# Patient Record
Sex: MALE | Race: WHITE
[De-identification: names, ages, dates, MRNs, and addresses within clinical notes are randomized per-mention and may not be internally consistent; named-entity substitution may affect disease eponyms.]

---

## 2020-03-26 ENCOUNTER — HOSPITAL ENCOUNTER (INPATIENT)
Dept: HOSPITAL 95 - ER | Age: 80
LOS: 11 days | Discharge: NURSING, REHAB OR LONG-TERM CARE | DRG: 871 | End: 2020-04-06
Attending: FAMILY MEDICINE | Admitting: HOSPITALIST
Payer: COMMERCIAL

## 2020-03-26 VITALS — HEIGHT: 70 IN | WEIGHT: 181.88 LBS | BODY MASS INDEX: 26.04 KG/M2

## 2020-03-26 DIAGNOSIS — N39.0: ICD-10-CM

## 2020-03-26 DIAGNOSIS — Z95.1: ICD-10-CM

## 2020-03-26 DIAGNOSIS — Z79.82: ICD-10-CM

## 2020-03-26 DIAGNOSIS — E03.9: ICD-10-CM

## 2020-03-26 DIAGNOSIS — Z96.1: ICD-10-CM

## 2020-03-26 DIAGNOSIS — I25.10: ICD-10-CM

## 2020-03-26 DIAGNOSIS — Z91.040: ICD-10-CM

## 2020-03-26 DIAGNOSIS — Z88.2: ICD-10-CM

## 2020-03-26 DIAGNOSIS — Z85.46: ICD-10-CM

## 2020-03-26 DIAGNOSIS — A41.89: Primary | ICD-10-CM

## 2020-03-26 DIAGNOSIS — I10: ICD-10-CM

## 2020-03-26 DIAGNOSIS — E87.6: ICD-10-CM

## 2020-03-26 DIAGNOSIS — Z88.8: ICD-10-CM

## 2020-03-26 DIAGNOSIS — E11.9: ICD-10-CM

## 2020-03-26 DIAGNOSIS — K21.9: ICD-10-CM

## 2020-03-26 DIAGNOSIS — Z92.3: ICD-10-CM

## 2020-03-26 DIAGNOSIS — G92: ICD-10-CM

## 2020-03-26 DIAGNOSIS — E80.6: ICD-10-CM

## 2020-03-26 DIAGNOSIS — F02.80: ICD-10-CM

## 2020-03-26 DIAGNOSIS — Z79.4: ICD-10-CM

## 2020-03-26 DIAGNOSIS — Z74.09: ICD-10-CM

## 2020-03-26 DIAGNOSIS — G40.A09: ICD-10-CM

## 2020-03-26 DIAGNOSIS — F34.1: ICD-10-CM

## 2020-03-26 DIAGNOSIS — I48.91: ICD-10-CM

## 2020-03-26 DIAGNOSIS — G30.9: ICD-10-CM

## 2020-03-26 DIAGNOSIS — I25.2: ICD-10-CM

## 2020-03-26 DIAGNOSIS — E87.1: ICD-10-CM

## 2020-03-26 DIAGNOSIS — G47.33: ICD-10-CM

## 2020-03-26 DIAGNOSIS — Z88.6: ICD-10-CM

## 2020-03-26 DIAGNOSIS — Z88.5: ICD-10-CM

## 2020-03-26 DIAGNOSIS — Z87.440: ICD-10-CM

## 2020-03-26 LAB
ALBUMIN SERPL BCP-MCNC: 3.4 G/DL (ref 3.4–5)
ALBUMIN/GLOB SERPL: 0.6 {RATIO} (ref 0.8–1.8)
ALT SERPL W P-5'-P-CCNC: 22 U/L (ref 12–78)
ANION GAP SERPL CALCULATED.4IONS-SCNC: 11 MMOL/L (ref 6–16)
AST SERPL W P-5'-P-CCNC: 27 U/L (ref 12–37)
BILIRUB SERPL-MCNC: 1.3 MG/DL (ref 0.1–1)
BUN SERPL-MCNC: 16 MG/DL (ref 8–24)
CALCIUM SERPL-MCNC: 9.2 MG/DL (ref 8.5–10.1)
CHLORIDE SERPL-SCNC: 98 MMOL/L (ref 98–108)
CO2 SERPL-SCNC: 19 MMOL/L (ref 21–32)
CREAT SERPL-MCNC: 0.7 MG/DL (ref 0.6–1.2)
ETHANOL SERPL-MCNC: <3 MG/DL
GLOBULIN SER CALC-MCNC: 5.4 G/DL (ref 2.2–4)
GLUCOSE SERPL-MCNC: 256 MG/DL (ref 70–99)
GLUCOSE UR-MCNC: (no result) MG/DL
KETONES UR STRIP-MCNC: (no result) MG/DL
LEUKOCYTE ESTERASE UR QL STRIP: (no result)
POTASSIUM SERPL-SCNC: 4.7 MMOL/L (ref 3.5–5.5)
PROT SERPL-MCNC: 8.8 G/DL (ref 6.4–8.2)
PROT UR STRIP-MCNC: (no result) MG/DL
RBC #/AREA URNS HPF: (no result) /HPF (ref 0–2)
SODIUM SERPL-SCNC: 128 MMOL/L (ref 136–145)
SP GR SPEC: 1.02 (ref 1–1.02)
UROBILINOGEN UR STRIP-MCNC: (no result) MG/DL
WBC #/AREA URNS HPF: (no result) /HPF (ref 0–5)

## 2020-03-26 PROCEDURE — G0008 ADMIN INFLUENZA VIRUS VAC: HCPCS

## 2020-03-26 PROCEDURE — A9270 NON-COVERED ITEM OR SERVICE: HCPCS

## 2020-03-26 PROCEDURE — G0480 DRUG TEST DEF 1-7 CLASSES: HCPCS

## 2020-03-27 LAB
ALBUMIN SERPL BCP-MCNC: 3.1 G/DL (ref 3.4–5)
ALBUMIN/GLOB SERPL: 0.7 {RATIO} (ref 0.8–1.8)
ALT SERPL W P-5'-P-CCNC: 20 U/L (ref 12–78)
ANION GAP SERPL CALCULATED.4IONS-SCNC: 8 MMOL/L (ref 6–16)
AST SERPL W P-5'-P-CCNC: 13 U/L (ref 12–37)
BASOPHILS # BLD AUTO: 0.06 K/MM3 (ref 0–0.23)
BASOPHILS NFR BLD AUTO: 1 % (ref 0–2)
BILIRUB SERPL-MCNC: 1 MG/DL (ref 0.1–1)
BUN SERPL-MCNC: 15 MG/DL (ref 8–24)
CALCIUM SERPL-MCNC: 9 MG/DL (ref 8.5–10.1)
CHLORIDE SERPL-SCNC: 103 MMOL/L (ref 98–108)
CO2 SERPL-SCNC: 23 MMOL/L (ref 21–32)
CREAT SERPL-MCNC: 0.86 MG/DL (ref 0.6–1.2)
DEPRECATED RDW RBC AUTO: 50.4 FL (ref 35.1–46.3)
EOSINOPHIL # BLD AUTO: 0.13 K/MM3 (ref 0–0.68)
EOSINOPHIL NFR BLD AUTO: 1 % (ref 0–6)
ERYTHROCYTE [DISTWIDTH] IN BLOOD BY AUTOMATED COUNT: 13.9 % (ref 11.7–14.2)
GLOBULIN SER CALC-MCNC: 4.3 G/DL (ref 2.2–4)
GLUCOSE SERPL-MCNC: 202 MG/DL (ref 70–99)
HCT VFR BLD AUTO: 43.3 % (ref 37–53)
HGB BLD-MCNC: 14.2 G/DL (ref 13.5–17.5)
IMM GRANULOCYTES # BLD AUTO: 0.09 K/MM3 (ref 0–0.1)
IMM GRANULOCYTES NFR BLD AUTO: 1 % (ref 0–1)
LYMPHOCYTES # BLD AUTO: 1.76 K/MM3 (ref 0.84–5.2)
LYMPHOCYTES NFR BLD AUTO: 14 % (ref 21–46)
MCHC RBC AUTO-ENTMCNC: 32.8 G/DL (ref 31.5–36.5)
MCV RBC AUTO: 98 FL (ref 80–100)
MONOCYTES # BLD AUTO: 1.35 K/MM3 (ref 0.16–1.47)
MONOCYTES NFR BLD AUTO: 11 % (ref 4–13)
NEUTROPHILS # BLD AUTO: 9.27 K/MM3 (ref 1.96–9.15)
NEUTROPHILS NFR BLD AUTO: 73 % (ref 41–73)
NRBC # BLD AUTO: 0 K/MM3 (ref 0–0.02)
NRBC BLD AUTO-RTO: 0 /100 WBC (ref 0–0.2)
PLATELET # BLD AUTO: 280 K/MM3 (ref 150–400)
POTASSIUM SERPL-SCNC: 4 MMOL/L (ref 3.5–5.5)
PROT SERPL-MCNC: 7.4 G/DL (ref 6.4–8.2)
SODIUM SERPL-SCNC: 134 MMOL/L (ref 136–145)

## 2020-03-27 NOTE — NUR
ON ADMIT TO THE FLOOR PT HAS A VERY RED AND SORE INNER THIGHS, PENIS, SCROTUM,
GLUTIAL CLEFT, AND GENERAL BITA AREA. NYSTATIC AND BARRIER CREAM APPLIED AND
ATTENDS CHANGED. ON REPORT IT WAS RELATED TO ME THAT PT HAD BEEN LAYING IN
URINE SOAKED GARMETS FOR SEVERAL DAYS.

## 2020-03-27 NOTE — NUR
SHIFT SUMMARY
PT IS CONFUSED, NOT UNDERSTANDING OF CARE. INCONTINENT OF BOWEL AND BLADDER.
HAS HAD MULTIPLE BM'S AND VOIDS THIS NIGHT. BITA AREA IS VERY RED AND SORE BUT
OVER THE CORSE OF THE NIGHT HAS IMPROVED WITH BARRIER CREAM AND NYSTATIN. HE
HAS HISTORY OF DEMENTIA. NS IS RUNNING /HR AND FIRST BAG IS ALMOST
COMPLETED. PT IS MUCH MORE ALERT THAN ON ADMIT. PT IS PRESENTLY LAYING IN BED
AND APPEARS TO BE SLEEPING. APPEARS TO BE IN NO ACUTE DISTRESS. VSS. RESP E/U
ON ROOM AIR. WILL CONTINUE TO MONITOR.

## 2020-03-27 NOTE — NUR
SHIFT SUMMARY
 
PT A/O X 2. PT UNABLE TO CALL APPROPRIATELY OR MAKE NEEDS KNOWN. PT TURNED Q 2
HOURS AND BITA CARE COMPLETED AT EACH INCONTINENT EPISODE. FLUIDS COMPLETED
AND IV ABX INFUSED. PT IS UNABLE TO REMAIN ALERT/AWAKE ENOUGH TO FEED SELF.
NURSING ORDER ENTERED TO HAVE PT SUPERVISED WHILE INTAKE OCCURS. PT HAS SCABS
ALL OVER BODY AND HAS A SIGNIFICANT RED RASH IN GROIN AND BUTTOCKS AREA WITH
NYSTATIN APPLIED AS ORDERED. LN TO CONTINUE TO MONOTOR.

## 2020-03-28 NOTE — NUR
PT STATED HE IS VERY THIRSTY. DOES ATTEMPT TO DRINK EXCESSIVELY. CONTINUES TO
SLIP TO "ABSENCE" STATE FOR 1/2 TO 1 MIN. THIS HAPPENS REGULARLY EVEN WHILE
TALKING. PICKED UP ARM, WHICH WAS LIMP. DROPPED ON SOFT SIDE OF BED AT PATIENT
SIDE, PT OFFERED NO RESISTANCE. PT "AWAKE" AGAIN, ASKED FOR DIET PEPSI FROM
[facility]. THEN AGAIN DRIFTED OFF.

## 2020-03-28 NOTE — NUR
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
RESPONDING MIDSENTENCE. LAST ABOUT A MINUTE. STERNAL RUB BARELY RESPONDS.
AFTER ABOUT MINUTE, WOULD RESPOND APPROP. 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
THIS HAPPENING SEVERAL TIMES THROUGHOUT ASSESSMENT. REPORTED TO DR TRAN.
ORDERS MADE. H/R REG, NO MURMER NOTED. NO TELE. LUNGS CLEAR RESP EASY,
UNLABORED, ON R/A. BT X4 LAST BM TODAY. LOOSE. REPORTED TO DR TRAN. STOOL
SAMPLE OBTAINED. R/O CDIFF. VOIDS INCONT. IN ATTENDS. BED IN LOW POSITION,
CALL LITE IN REACH, CALLS APPROP

## 2020-03-28 NOTE — NUR
SHIFT SYUMMARY: PATIENT IS A&O TO SELF AND PLACE, PLACE IS INTERMITTENT. MUST
BE CUED TO OPEN EYE'S WHEN SPEAKING. PATIENT IS REQUESTING FLUIDS AND FOOD.
WATER WAS GIVEN WITHOUT DIFFICULTY, PATIENT MUST BE ASSISTED FOR MEALS.
PERSISTANT LOW GRADE TEMP WAS REPORTED TO DR ASH, ORDER FOR TYLENOL PRN WAS
OBTAINED. BED ALARM IS ON FOR SAFETY.

## 2020-03-28 NOTE — NUR
PT PLEASANT WHEN AWAKE. PT STATES TO BE THIRSTY OFTEN. DOES DRINK WELL WHEN
AWAKE. CONTINUES TO DRIFT OFF TO "ABSENCE" REGULARLY, OFTEN MID SENTENCE. 
NOTIFIED. PENDING EEG. WAS TESTED TODAY FOR C-DIFF AS LOOSE STOOL NOTED.
NEGATIVE. NO OTHER CONCERNS AT THIS TIME. BED IN LOW POSITION, CALL LITE IN
REACH, BED ALARM ON FOR SAFETY

## 2020-03-28 NOTE — NUR
TEMPERATURE: PATIENT HAS A LOW GRADE TEMP. AND GENERALIZED PAIN. DR ASH IS
CALLED AND AN ORDER FOR TYLENOL 650MG Q 4 HOURS PRN IS OBTAINED.

## 2020-03-29 LAB
ANION GAP SERPL CALCULATED.4IONS-SCNC: 7 MMOL/L (ref 6–16)
BASOPHILS # BLD AUTO: 0.06 K/MM3 (ref 0–0.23)
BASOPHILS NFR BLD AUTO: 1 % (ref 0–2)
BUN SERPL-MCNC: 11 MG/DL (ref 8–24)
CALCIUM SERPL-MCNC: 9.1 MG/DL (ref 8.5–10.1)
CHLORIDE SERPL-SCNC: 104 MMOL/L (ref 98–108)
CO2 SERPL-SCNC: 25 MMOL/L (ref 21–32)
CREAT SERPL-MCNC: 0.9 MG/DL (ref 0.6–1.2)
DEPRECATED RDW RBC AUTO: 51.7 FL (ref 35.1–46.3)
EOSINOPHIL # BLD AUTO: 0.25 K/MM3 (ref 0–0.68)
EOSINOPHIL NFR BLD AUTO: 4 % (ref 0–6)
ERYTHROCYTE [DISTWIDTH] IN BLOOD BY AUTOMATED COUNT: 14.1 % (ref 11.7–14.2)
GLUCOSE SERPL-MCNC: 205 MG/DL (ref 70–99)
HCT VFR BLD AUTO: 42.7 % (ref 37–53)
HGB BLD-MCNC: 14.1 G/DL (ref 13.5–17.5)
IMM GRANULOCYTES # BLD AUTO: 0.06 K/MM3 (ref 0–0.1)
IMM GRANULOCYTES NFR BLD AUTO: 1 % (ref 0–1)
LYMPHOCYTES # BLD AUTO: 1.21 K/MM3 (ref 0.84–5.2)
LYMPHOCYTES NFR BLD AUTO: 17 % (ref 21–46)
MCHC RBC AUTO-ENTMCNC: 33 G/DL (ref 31.5–36.5)
MCV RBC AUTO: 99 FL (ref 80–100)
MONOCYTES # BLD AUTO: 0.68 K/MM3 (ref 0.16–1.47)
MONOCYTES NFR BLD AUTO: 10 % (ref 4–13)
NEUTROPHILS # BLD AUTO: 4.73 K/MM3 (ref 1.96–9.15)
NEUTROPHILS NFR BLD AUTO: 68 % (ref 41–73)
NRBC # BLD AUTO: 0 K/MM3 (ref 0–0.02)
NRBC BLD AUTO-RTO: 0 /100 WBC (ref 0–0.2)
PLATELET # BLD AUTO: 227 K/MM3 (ref 150–400)
POTASSIUM SERPL-SCNC: 3.4 MMOL/L (ref 3.5–5.5)
SODIUM SERPL-SCNC: 136 MMOL/L (ref 136–145)

## 2020-03-29 NOTE — NUR
NEURO: PATIENT IS BACK FROM CT, PATIENT WAS NOT COOPERATIVE WITH IMAGING AND
MAY ALTER QUALITY PER CT TECH. PATIENT IS NO MORE ALERT AND IS REPORTING
HEADACHE PAIN 6/10, FIORICET IS GIVEN.

## 2020-03-29 NOTE — NUR
PT ALERT. SITTING UP IN BED, ATTENDS CHANGED, WARM BLANKETS PLACED. MEDICATED
w/TYLENOL x2 FOR HA TODAY. NO DYSPHAGIA NOTED WHILE TAKING PO MEDS. PT APPEARS
TO HAVE SUDDEN BUT SHORT MOMENTS OF A TRANCE-LIKE STATE T/O MY SHIFT. BS
COVERAGE REQUIRED AT LUNCH & DINNER. PT REQUIRES FEEDING FOR ALL MEALS. PT
WAS PLEASANT & COOPERATIVE T/O SHIFT. NO SIGNS OF DISTRESS. NO ACUTE CHANGES
AT END OF MY CARE.

## 2020-03-29 NOTE — NUR
SHIFT SUMMARY: PATIENT IS AWAKE THIS AM, ALERT TO SELF AND PLACE
CALLING OUT FOR SOMETHING TO DRINK,
EXCESSIVE THIRST PERSISTS. INC. OF B&B, BITA CARE IS GIVEN WITH T&P. CT SCAN
SHOWS NO ACUTE ABNOMATITIES.

## 2020-03-29 NOTE — NUR
PAIN: PATIENT IS REPORTING HEADACHE PAIN, TYLENOL WAS GIVEN AND PATIENT
STATES, "THIS HEADACHE HURTS SO BAD, WHEN WILL IT GO AWAY"?

## 2020-03-29 NOTE — NUR
NEURO: DR MENARD WAS NOTIFIED OF HEADACHE PAIN NOT RELIVED BY TYLENOL. AM
LABS WERE DRAW EARLY AND DR REVIEWED RESULTS. ORDERS FOR FIORICET Q 6 H PRN
AND HEAD CY WERE OBTAINED. WHEN MEDICATION WAS ATTEMPTED PATIENT IS NOW
SNORINF AND DIFFICULT TO WAKE. WHEN PATIENT IS AROUSED HE STATES, "JUST LEAVE
ME ALONE". MEDICATION IS NOT GIVEN AND PATIENT IS TAKEN TO CT.

## 2020-03-30 NOTE — NUR
SHIFT SUMMARY:
NO ACUTE CHANGES TO REPORT THIS SHIFT. PT ALERT; ORIENTED TO SELF AND PLACE.
HX DEMENTIA, SEIZURES; SEIZURE MEDS ADJUSTED THIS SHIFT (DR TRAN). PT ON
BEDREST. AWAITING EEG FOR SEIZURE ACTIVITY. REPORT GIVEN TO ONCOMING RN.

## 2020-03-30 NOTE — NUR
SHIFT SUMMARY: PATIENT IS A&O X 2, PLACE AND SELF. COMPLIANS OF INTERMITTENT
HEADACHE, TYLENOL WAS GIVEN WITH GOOD EFFECT. PATIENT CONTINUES TO HAVE
EPISODES OF ABRUPT DECREASED RESPONSE. IT IS NOTED THAT PUPLS ARE 1 MM DURING
THE EPISODE, VS REMAIN STABLE. PATIENT DID NOT SLEEP WELL THIS SHIFT AND IS
SCHEDULE FOR EEG TODAY.

## 2020-03-31 NOTE — NUR
03/31/20  0600  PT SLEPT ON AND OFF. MEDICATED FOR HEADACHE ONCE. INCONTINENT
OF URINE AND CHANGED PRN. VITALS STABLE.

## 2020-03-31 NOTE — NUR
Pt resting in bed upon arrival. Pt is pleasantly confused and orientated to
self. Pt appears mildly anxious with no other non verbal indicators of
discomfort.
 
Spoke with Bedside RN Basil and discussed case. Plan is for EEG this AM once
Pt is settled and able to sit still long enough to perform test.
 
Called and spoke with Pt's son Leo. Updated Leo on plan of care and answered
questions. Offered therapeutic listening. Leo reports at baseline Pt is
ambulatory with walker. Pt is able to bathe and dress himself some times but
requires assistance occasionaly. Pt has experienced incontinence of urine
since having prostate cancer. Pt receives in home caregiver support for 3
hours a day 5 days a week. Leo works full time. Discussed the possibility of
needing more caregiver support or a higher level of care if Pt does not return
to baseline.  Continued therapeutic listening. Discussed Advanced Directive
and POLST. Leo reports Pt would want everything done in an acute event but
would not want long term life support. No other concerns reported at this
time.
 
Spoke with Caremanager Perla and discussed case.
 
Received copy of [facility] via fax per this RN's
request. Will send copy to medical records.
 
Palliative Care will remain available.

## 2020-03-31 NOTE — NUR
SHIFT SUMMARY:
NO ACUTE EVENTS TO REPORT THIS SHIFT. PT ALERT; ORIENTED TO SELF AND PLACE.
MEDICATED FOR HEADACHE PAIN PER EMAR. FEEDER; PO MEDS WHOLE IN THIN LIQUIDS.
EEG THIS SHIFT; AWAITING RESULTS. REPORT GIVEN TO ONCOMING RN.

## 2020-04-01 NOTE — NUR
Pt visit this AM. Pt is A&OX3 at the time of visit. Pt engages in nonsensical
conversation periodically. Spoke with Bedside RN Stan. Stan reports Pt is
still experiencing absence sizures.
 
Reviewed chart.
 
Palliative Care will remain available.

## 2020-04-01 NOTE — NUR
04/01/20   0600  PT SLEEPING ON AND OFF. VITALS STABLE AND WAS MEDICATED FOR
HEADACHES TWICE THIS SHIFT. BITA-CARE GIVEN WITH BRIEF CHANGES. CONTINUES TO
HAVE OCC. PETITE MAL SIEZURES WITHOUT ANY RESP, AFFECT. MD IS AWARE.

## 2020-04-01 NOTE — NUR
PT IS A/OX3, THE PT HAS MILD DEMENTIA AND FORGETFULLNESS, THE PT APPEARS TO BE
BREATHING EASILY ON RA. THE PT TODAY WAS VERY SLEEPY, CONTINUES TO HAVE BLANK
STARES AT TIMES, THE WAS ENCOURAGED TO EAT AND ASSISTED WITH MEALS, HOWEVER
ATE VERY LITTLS, THE PTS SCROTUM AND COCCYX IS RED AND EXCORIATED, LOTION AND
NYSTATIN POWDER WAS APPLIED INTERMITIANTLY T/O THE DAY, THE PHYSICAL
THERAPIST ATTEMPTED TO ASSIST THE PT UP, THE PT SAID THAT HE WAS WILLING,
KATT, WHEN THE ATTEMPT WAS MADE TO SIT AT THE SIDE OF THE BED HE REFUSED AND
LAYED BACK DOWN, PTS CALL LIGHT IS WITHIN REACH, WILL CONTINUE TO MONITOR AND
ASSESS FOR CHANGES

## 2020-04-02 LAB
ANION GAP SERPL CALCULATED.4IONS-SCNC: 10 MMOL/L (ref 6–16)
BASOPHILS # BLD AUTO: 0.04 K/MM3 (ref 0–0.23)
BASOPHILS NFR BLD AUTO: 1 % (ref 0–2)
BUN SERPL-MCNC: 9 MG/DL (ref 8–24)
CALCIUM SERPL-MCNC: 9.3 MG/DL (ref 8.5–10.1)
CHLORIDE SERPL-SCNC: 101 MMOL/L (ref 98–108)
CO2 SERPL-SCNC: 26 MMOL/L (ref 21–32)
CREAT SERPL-MCNC: 0.77 MG/DL (ref 0.6–1.2)
DEPRECATED RDW RBC AUTO: 48.6 FL (ref 35.1–46.3)
EOSINOPHIL # BLD AUTO: 0.24 K/MM3 (ref 0–0.68)
EOSINOPHIL NFR BLD AUTO: 4 % (ref 0–6)
ERYTHROCYTE [DISTWIDTH] IN BLOOD BY AUTOMATED COUNT: 13.7 % (ref 11.7–14.2)
GLUCOSE SERPL-MCNC: 120 MG/DL (ref 70–99)
HCT VFR BLD AUTO: 44.7 % (ref 37–53)
HGB BLD-MCNC: 14.8 G/DL (ref 13.5–17.5)
IMM GRANULOCYTES # BLD AUTO: 0.1 K/MM3 (ref 0–0.1)
IMM GRANULOCYTES NFR BLD AUTO: 2 % (ref 0–1)
LYMPHOCYTES # BLD AUTO: 2.61 K/MM3 (ref 0.84–5.2)
LYMPHOCYTES NFR BLD AUTO: 39 % (ref 21–46)
MCHC RBC AUTO-ENTMCNC: 33.1 G/DL (ref 31.5–36.5)
MCV RBC AUTO: 96 FL (ref 80–100)
MONOCYTES # BLD AUTO: 0.62 K/MM3 (ref 0.16–1.47)
MONOCYTES NFR BLD AUTO: 9 % (ref 4–13)
NEUTROPHILS # BLD AUTO: 3.15 K/MM3 (ref 1.96–9.15)
NEUTROPHILS NFR BLD AUTO: 47 % (ref 41–73)
NRBC # BLD AUTO: 0 K/MM3 (ref 0–0.02)
NRBC BLD AUTO-RTO: 0 /100 WBC (ref 0–0.2)
PLATELET # BLD AUTO: 254 K/MM3 (ref 150–400)
POTASSIUM SERPL-SCNC: 3.4 MMOL/L (ref 3.5–5.5)
SODIUM SERPL-SCNC: 137 MMOL/L (ref 136–145)

## 2020-04-02 NOTE — NUR
SHIFT SUMMARY:
VSS. AFEB. A/OX1. ABLE TO GIVE SOME PERSONAL HEALTH HISTORY BUT DOES NOT
RECALL D.O.B, CURRENT DATE, OR LOCATION. CALLS OUT RATHER THAN USES CALL
BUTTON FOR ASSIST. REPORTS HA- MEDICATED W/TYL W/GOOD EFFECT. PT REMAINED IN
BED, ACCEPTED MEDS AND OFFER OF FLUIDS. BED LOW, CALL BUTTON IN REACH, BED
ALARM ON. NO ATTEMPTS TO SELF T/F. NO ACUTE CHANGES OVERNIGHT. WILL CONT TO
MONITOR.

## 2020-04-02 NOTE — NUR
PT IS A/OX3, PT IS SLOW TO RESPOND AT TIMES, THE PT CONTINUES TO HAVE BLANK
STARES AT TIMES, FORGETFULL AT TIMES, THE PT APPEARS TO BE BREATHING EASILY ON
RA AT THIS TIME, THE PT WAS ENCOURAGED TO GET UP OUT OF BED TODAY AND AGREED,
HOWEVR, EACH TIME HE WAS OFFERED ASSISTANTANCE HE RESISTED AND WOULD NOT
PARTICIPATE, PT WAS ASSISTED WITH MEALS, HOWEVER HAD A POOR APPETITE, THE
OCCUPATIONAL THERAPIST ATTEMPTED TO DUE A COGNATIVE ASSESSMENT WITH THE PT
HOWEVER, HE DID NOT PARTICIPATE, CALL LIGHT IN REACH, WILL CONTINUE TO MONITOR
AND ASSESS FOR CHANGES

## 2020-04-03 LAB
ANION GAP SERPL CALCULATED.4IONS-SCNC: 7 MMOL/L (ref 6–16)
BUN SERPL-MCNC: 12 MG/DL (ref 8–24)
CALCIUM SERPL-MCNC: 9.1 MG/DL (ref 8.5–10.1)
CHLORIDE SERPL-SCNC: 102 MMOL/L (ref 98–108)
CO2 SERPL-SCNC: 26 MMOL/L (ref 21–32)
CREAT SERPL-MCNC: 0.78 MG/DL (ref 0.6–1.2)
GLUCOSE SERPL-MCNC: 128 MG/DL (ref 70–99)
POTASSIUM SERPL-SCNC: 3.8 MMOL/L (ref 3.5–5.5)
SODIUM SERPL-SCNC: 135 MMOL/L (ref 136–145)

## 2020-04-03 NOTE — NUR
SHIFT SUMMARY-
PT ALERT AND ORIENTED TO SELF. PT BECOMES VERY COMBATIVE WHEN STAFF ARE
ROLLING AND CHANGING HIM. HE IS VERY FEARFULL OF FALLING. PT HAS MOMENTS WHERE
HE SEEMS TO BE NORMAL FUNCTIONING AND THEN HE WILL STARE OFF INTO SPACE AND
DROP ITEMS THAT HE IS HOLDING. PT DID WORK WITH PHYSICAL THERAPY TODAY. PT TOO
FEARFUL TO MOVE BUT DID PERFORM 4 SIT TO STANDS. PT HAS DENIED THE NEED FOR
PAIN MANAGEMENT MEDICATION T/O THE SHIFT. PT INCONTINENT OF BOWEL AND BLADDER,
MEPILEX ON COCCYX WAS CHANGED TODAY. PT WAS SEEN BY DR CHARLTON TODAY. PG
IN RIGHT UPPER ARM DOES NOT DRAW BUT FLUSHES WELL. WILL PASS ON IN BEDSIDE
REPORT TO NIGHT RN.

## 2020-04-03 NOTE — NUR
SPOKE TO DR PITT-
PER  RN TO CALL PHYSICAL THERAPY AND HAVE THEM EVALUATE THE PT. PER DR PITT PT STATES HE PROMISES TO WORK WITH THERAPY IF THEY COME BACK TODAY.
SPOKE TO PHYSICAL THERAPIST AND SHE IS AWARE. ORDER FOR PT EVAL AND TREAT
PLACED IN PT ORDERS PER DR PITT.

## 2020-04-03 NOTE — NUR
SHIFT SUMMARY
ASSUMED CARE OF PT AT 1900. PT IS A/OX3, BUT IS CONFUSED AT TIMES AND DOESNT
FOLLOW DIRECTIONS ALL THE TIME, PT DENIES N/T IN HIS EXTREMITIES. HEART SOUNDS
REGULAR, LUNG SOUNDS CLEAR. PT WAS ABLT TO SWOLLOW PILL WITH WATER. PT HAS
REDNESS ON HIS BOTTOM, MEPILEX APPLIED. PT HAS SMALL ABRASION ON HIS PENIS,
LEFT OPEN TO AIR. PT CALLED OUT FOR THE NURSE DURING THE NIGHT, BUT HE WOULD
HAVE HIS EYES CLOSED WHEN ENTERING THE ROOM. PT ATE LOTS OF SNACKS WITH
ASSISTANCE FROM CNA.
 
NO ACUTE EVENTS DURING THE NIGHT, PT SLEPT MOST OF THE NIGHT. CALL LIGHT IN
REACH, BED IN LOWEST POSTION, WILL CONTINUE TO MONITOR UNTIL DAYSHIFT NURSE
ARRIVES.

## 2020-04-04 LAB
ANION GAP SERPL CALCULATED.4IONS-SCNC: 6 MMOL/L (ref 6–16)
BUN SERPL-MCNC: 15 MG/DL (ref 8–24)
CALCIUM SERPL-MCNC: 9.3 MG/DL (ref 8.5–10.1)
CHLORIDE SERPL-SCNC: 103 MMOL/L (ref 98–108)
CO2 SERPL-SCNC: 28 MMOL/L (ref 21–32)
CREAT SERPL-MCNC: 0.94 MG/DL (ref 0.6–1.2)
GLUCOSE SERPL-MCNC: 126 MG/DL (ref 70–99)
POTASSIUM SERPL-SCNC: 4.1 MMOL/L (ref 3.5–5.5)
SODIUM SERPL-SCNC: 137 MMOL/L (ref 136–145)

## 2020-04-04 NOTE — NUR
PT SLEEPING SOUNDLY UNABLE TO WAKE ENOUGH TO SAFELY ADMINISTER PO MEDICATIONS,
WILL ATTEMPT WHEN THE PT IS ALERT ENOUGH TO SAFELY SWALLOW.

## 2020-04-04 NOTE — NUR
CALLED PHARMACY-
PT RECIEVED 0800 METFORMIN DOSE AT 1100 SHOULD THE 1700 DOSE BE PUSHED BACK A
LITTLE? OK TO GIVE AT 1700 AS SCHEDULED PER PHA DANIA.

## 2020-04-04 NOTE — NUR
SHIFT SUMMARY
PATIENT HAD NO ACUTE CHANGES OBSERVED. AXO TO SELF. HX OF DEMENTIA. BEDFAST.
.  POWERGLIDE ALFA INTACT. VSS/AFEBRILE. DENIES PAIN, SOB, AND N/V.
[place]. PATIENT BED EXIT ALARM X FIVE WITH PATIENT SHIFTING IN BED TO SIDE. CALL
LIGHT IN REACH. BED IN LOWEST POSITION. WILL CONTINUE TO MONITOR UNTIL DAY
SHIFT NURSE ASSUMES CARE.

## 2020-04-04 NOTE — NUR
CALLED PHARMACY-
 PT HAD DEPAKOTE 500 AT 0900 AND 2100  AT NOON. PT DID NOT WAKE UNTIL
1100 TO SAFELY ADMINISTER MEDICATIONS. PER PHARMACY 1200 DOSE OF 250MG
DEPAKOTE SHOULD BE PUSHED BACK TO 1500.

## 2020-04-04 NOTE — NUR
SHIFT SUMMARY-
PT HAS HAD NO ACUTE CHANGE T/O THE SHIFT, DENIES THE NEED FOR PAIN MEDICATION
WHEN ASKED. PT DID HAVE A LOWER BP THIS EVENING SBP 92. WENT BACK A SHORT TIME
LATER (SEE VITALS FOR EXACT TIME) AND ASKED THE PT TO WAKE UP AND TALK WITH
ME, HE DID, THEN VITALS WERE RECHECKED .

## 2020-04-05 NOTE — NUR
SHIFT SUMMARY
PATIENT HAD NO ACUTE CHANGES OBSERVED. AXOX TWO AND BEDREST. .
POWERGLIDE GISELE INTACT. DENIES PAIN, SOB, AND N/V. VSS/AFEBRILE. NO BED EXIT
ALARM EVENTS THIS SHIFT. CALL LIGHT IN REACH. BED IN LOWEST POSITION AND
ALARM ACTIVATED. WILL CONTINUE TO MONITOR UNTIL DAY SHIFT NURSE ASSUMES CARE.

## 2020-04-05 NOTE — NUR
SHIFT SUMMARY-
PT HAS HAD NO ACUTE CHANGE THIS SHIFT. HE SPENT MORE TIME AWAKE TODAY THAN
PREVIOUS DAYS AND SEEMS TO BE ABLE TO FOLLOW SOME DIRECTION WHEN GIVEN SLOWLY,
1 STEP AT A TIME AND LOUDLY ENOUGH THAT HE CAN HEAR IT. STILL INCONTINENT OF
BOWEL AND BLADDER. FREQUENT BITA CARE PERFORMED. PT WAS GETING NO BETTER WITH
THE USE OF BARRIER CREAM. TODAY NO BARIER CREAM WAS USED, INSTEAD BABY POWDER
WAS APPLIED, SKIN HAS SHOWED IMPROVEMENT T/O THE DAY. RECOMEND CONTINUEING
WITH POWDER RATHER THAN CREAM AT THIS TIME. PT HAS A LOT OF REDNESS ON THE
HEAD OF HIS PENIS AND THE SURROUNDING [place], IT IS PAINFUL TO PULL BACK BUT
HAS A LOT OF MOISTURE TRAPPED AND NEEDS REGULAR CLEANING TO HELP IT IMPROVE.
NO SWELLING OF THE AREA HAS BEEN NOTED.

## 2020-04-06 NOTE — NUR
SHIFT SUMMARY
PATIENT HAD NO ACUTE CHANGES OBSERVED. AXO 2 WITH HX [place]. .
VSS/AFEBRILE. DENIES PAIN, SOB, AND N/V. PATIENT CONTINUES TO PULL ALL TISSUES
OUT OF HIS BOX. TISSUE PLACED OUT OF REACH AND WILL ASK IF HE NEEDS SOME.
TAKES MEDICATION WITH APPLESAUCE. CALL LIGHT IN REACH. BED IN LOWEST POSITION.
WILL CONTINUE TO MONITOR UNTIL DAY SHIFT NURSE ASSUMES CARE.

## 2020-04-06 NOTE — NUR
TRANSFER TO SNF
PT IS CONFUSED, HX DEMENTIA. HE IS CALM/COOPERATIVE. FLAT AFFECT. NO PAIN THIS
AM. NO S/S SEIZURES.  ISTRATE IN TO SEE HIM STATE READY FOR TRANSFER TO SNF
TODAY, PLACE D/C ORDERS. RN CARE  FAMILY & MAKE ARRANGEMENTS
FOR TRANSFER TO [facility]. REPORT CALLED TO CECILLE OTTO. TRANSFER VIA [facility]
W/C VAN ARRANGED FOR 1400. IV D/C INTACT. CNA PROVIDE COMPLETE BEDBATH, ASSIST
TO DRESS.

## 2020-10-07 ENCOUNTER — HOSPITAL ENCOUNTER (OUTPATIENT)
Dept: HOSPITAL 95 - LAB RH | Age: 80
Discharge: HOME | End: 2020-10-07
Attending: FAMILY MEDICINE
Payer: COMMERCIAL

## 2020-10-07 DIAGNOSIS — F03.90: ICD-10-CM

## 2020-10-07 DIAGNOSIS — E11.65: Primary | ICD-10-CM

## 2020-10-07 DIAGNOSIS — I48.20: ICD-10-CM

## 2020-10-07 DIAGNOSIS — M62.81: ICD-10-CM

## 2020-10-07 LAB
ALBUMIN SERPL BCP-MCNC: 2.8 G/DL (ref 3.4–5)
ALBUMIN/GLOB SERPL: 0.7 {RATIO} (ref 0.8–1.8)
ALT SERPL W P-5'-P-CCNC: 14 U/L (ref 12–78)
ANION GAP SERPL CALCULATED.4IONS-SCNC: 5 MMOL/L (ref 6–16)
AST SERPL W P-5'-P-CCNC: 18 U/L (ref 12–37)
BILIRUB SERPL-MCNC: 0.5 MG/DL (ref 0.1–1)
BUN SERPL-MCNC: 19 MG/DL (ref 8–24)
CALCIUM SERPL-MCNC: 9.3 MG/DL (ref 8.5–10.1)
CHLORIDE SERPL-SCNC: 107 MMOL/L (ref 98–108)
CHOLEST SERPL-MCNC: 92 MG/DL (ref 50–200)
CHOLEST/HDLC SERPL: 2.8 {RATIO}
CO2 SERPL-SCNC: 31 MMOL/L (ref 21–32)
CREAT SERPL-MCNC: 0.86 MG/DL (ref 0.6–1.2)
GLOBULIN SER CALC-MCNC: 4.3 G/DL (ref 2.2–4)
GLUCOSE SERPL-MCNC: 54 MG/DL (ref 70–99)
HDLC SERPL-MCNC: 33 MG/DL (ref 39–?)
LDLC SERPL CALC-MCNC: 37 MG/DL (ref 0–110)
LDLC/HDLC SERPL: 1.1 {RATIO}
POTASSIUM SERPL-SCNC: 4 MMOL/L (ref 3.5–5.5)
PROT SERPL-MCNC: 7.1 G/DL (ref 6.4–8.2)
SODIUM SERPL-SCNC: 143 MMOL/L (ref 136–145)
TRIGL SERPL-MCNC: 108 MG/DL (ref 30–160)
TSH SERPL DL<=0.005 MIU/L-ACNC: 0.48 UIU/ML (ref 0.36–4.8)
VLDLC SERPL CALC-MCNC: 21 MG/DL (ref 6–32)